# Patient Record
Sex: FEMALE | Race: WHITE | NOT HISPANIC OR LATINO
[De-identification: names, ages, dates, MRNs, and addresses within clinical notes are randomized per-mention and may not be internally consistent; named-entity substitution may affect disease eponyms.]

---

## 2022-04-07 PROBLEM — Z00.00 ENCOUNTER FOR PREVENTIVE HEALTH EXAMINATION: Status: ACTIVE | Noted: 2022-04-07

## 2022-04-10 ENCOUNTER — NON-APPOINTMENT (OUTPATIENT)
Age: 37
End: 2022-04-10

## 2022-04-11 ENCOUNTER — NON-APPOINTMENT (OUTPATIENT)
Age: 37
End: 2022-04-11

## 2022-04-11 ENCOUNTER — APPOINTMENT (OUTPATIENT)
Dept: COLORECTAL SURGERY | Facility: CLINIC | Age: 37
End: 2022-04-11
Payer: MEDICAID

## 2022-04-11 VITALS
HEIGHT: 61 IN | WEIGHT: 113 LBS | BODY MASS INDEX: 21.34 KG/M2 | HEART RATE: 69 BPM | TEMPERATURE: 98.3 F | DIASTOLIC BLOOD PRESSURE: 84 MMHG | SYSTOLIC BLOOD PRESSURE: 138 MMHG

## 2022-04-11 DIAGNOSIS — F41.9 ANXIETY DISORDER, UNSPECIFIED: ICD-10-CM

## 2022-04-11 DIAGNOSIS — Z86.010 PERSONAL HISTORY OF COLONIC POLYPS: ICD-10-CM

## 2022-04-11 DIAGNOSIS — Z78.9 OTHER SPECIFIED HEALTH STATUS: ICD-10-CM

## 2022-04-11 DIAGNOSIS — Z83.3 FAMILY HISTORY OF DIABETES MELLITUS: ICD-10-CM

## 2022-04-11 DIAGNOSIS — Z01.818 ENCOUNTER FOR OTHER PREPROCEDURAL EXAMINATION: ICD-10-CM

## 2022-04-11 PROCEDURE — 99205 OFFICE O/P NEW HI 60 MIN: CPT

## 2022-04-11 RX ORDER — METRONIDAZOLE 500 MG/1
500 TABLET ORAL
Qty: 6 | Refills: 0 | Status: ACTIVE | COMMUNITY
Start: 2022-04-11 | End: 1900-01-01

## 2022-04-11 RX ORDER — NEOMYCIN SULFATE 500 MG/1
500 TABLET ORAL
Qty: 6 | Refills: 0 | Status: ACTIVE | COMMUNITY
Start: 2022-04-11 | End: 1900-01-01

## 2022-04-11 NOTE — HISTORY OF PRESENT ILLNESS
[FreeTextEntry1] : 37 y/o F presents for evaluation of colon cancer, referred by GI Dr. Carolin Nur\par H/o anxiety \par \par Pt initially [facility] for 6 months of intermittent abdominal pain per pt described "stabbing quality with change in BH, intermittent soft stool with cramping urgency" BRBPR . \par \par Pt underwent initial screening colonoscopy on 4/5/22:\par Ulcerated, friable circumferential lesion in midtransverse colon, biopsies were taken and distal margin was marked with two Rosario ink tattoos.  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 No adenoma appreciated\par \par Referred for CT A/P 4/1/22 (completed at [facility]):\par Impression: \par 5 cm circumferential bowel wall thickening of the transverse colon highly suspicious for primary colonic malignancy\par Adjacent necrotic appearing lymphadenopathy highly suspicious for metastatic disease \par Indeterminate 5 mm hypoenhancing lesions in the left lobe of the liver \par No large bowel obstruction\par \par \par Prior imaging includes Abdominal US completed [facility] 2/4/22\par Impression: \par 4 mm gallbladder polyp which based on ACR guidelines does not require further evaluation. \par \par \par c/o\par Denies\par fever, unintentional weight loss, n/v/c/d or change in BHs\par \par BH: \par FMH of colorectal cancer, Mother (+) Celiac disease \par

## 2022-04-11 NOTE — PHYSICAL EXAM
[Abdomen Masses] : No abdominal masses [Abdomen Tenderness] : ~T No ~M abdominal tenderness [No HSM] : no hepatosplenomegaly [JVD] : no jugular venous distention  [Normal Breath Sounds] : Normal breath sounds [Normal Heart Sounds] : normal heart sounds [Alert] : alert [Calm] : calm

## 2022-04-11 NOTE — ASSESSMENT
[FreeTextEntry1] : Transverse colon cancer–mismatch repair status pending.\par \par CT with consistent with local/regional adenopathy.\par \par I had extensive discussion with the patient and her  regarding her diagnosis.  Recommend CT of chest for completion of staging.\par \par I had extensive discussion with the patient (60 minutes) regarding the diagnosis and treatment options. I recommended that he consider proceeding with a robotic assisted extended right hemicolectomy.\par The associated risks, benefits, alternatives of the procedure have been outlined discussed and reviewed with the patient's family. These risks including but not limited to bleeding, infection, anastomotic leak, need for secondary surgery, need for ileostomy or colostomy creation, change in bowel habits,  as well as the risk of heart and lung complications infection and death were detailed. The patient understands these risks and consents the planned procedure. Appropriate  literature regarding surgery and post operative treatment/complications and enhanced recovery pathway has been detailed and reviewed. Consent was obtained. All questions were answered.\par

## 2022-04-13 ENCOUNTER — OUTPATIENT (OUTPATIENT)
Dept: OUTPATIENT SERVICES | Facility: HOSPITAL | Age: 37
LOS: 1 days | End: 2022-04-13
Payer: COMMERCIAL

## 2022-04-13 ENCOUNTER — RESULT REVIEW (OUTPATIENT)
Age: 37
End: 2022-04-13

## 2022-04-13 DIAGNOSIS — C18.9 MALIGNANT NEOPLASM OF COLON, UNSPECIFIED: ICD-10-CM

## 2022-04-13 PROCEDURE — 88321 CONSLTJ&REPRT SLD PREP ELSWR: CPT

## 2022-04-14 LAB — SURGICAL PATHOLOGY STUDY: SIGNIFICANT CHANGE UP

## 2022-04-18 ENCOUNTER — NON-APPOINTMENT (OUTPATIENT)
Age: 37
End: 2022-04-18

## 2022-04-26 ENCOUNTER — TRANSCRIPTION ENCOUNTER (OUTPATIENT)
Age: 37
End: 2022-04-26

## 2022-04-26 VITALS
HEIGHT: 61 IN | HEART RATE: 82 BPM | WEIGHT: 112.44 LBS | OXYGEN SATURATION: 98 % | RESPIRATION RATE: 16 BRPM | DIASTOLIC BLOOD PRESSURE: 82 MMHG | TEMPERATURE: 98 F | SYSTOLIC BLOOD PRESSURE: 123 MMHG

## 2022-04-26 RX ORDER — CLONAZEPAM 1 MG
1 TABLET ORAL
Qty: 0 | Refills: 0 | DISCHARGE

## 2022-04-26 RX ORDER — LEVONORGESTREL 1.5 MG/1
0 TABLET ORAL
Qty: 0 | Refills: 0 | DISCHARGE

## 2022-04-26 RX ORDER — SERTRALINE 25 MG/1
1 TABLET, FILM COATED ORAL
Qty: 0 | Refills: 0 | DISCHARGE

## 2022-04-26 NOTE — PATIENT PROFILE ADULT - FALL HARM RISK - UNIVERSAL INTERVENTIONS
Bed in lowest position, wheels locked, appropriate side rails in place/Call bell, personal items and telephone in reach/Instruct patient to call for assistance before getting out of bed or chair/Non-slip footwear when patient is out of bed/[place] to call system/Physically safe environment - no spills, clutter or unnecessary equipment/Purposeful Proactive Rounding/Room/bathroom lighting operational, light cord in reach

## 2022-04-26 NOTE — PATIENT PROFILE ADULT - VISION (WITH CORRECTIVE LENSES IF THE PATIENT USUALLY WEARS THEM):
partial dentures/in place today Partially impaired: cannot see medication labels or newsprint, but can see obstacles in path, and the surrounding layout; can count fingers at arm's length

## 2022-04-27 ENCOUNTER — TRANSCRIPTION ENCOUNTER (OUTPATIENT)
Age: 37
End: 2022-04-27

## 2022-04-27 ENCOUNTER — INPATIENT (INPATIENT)
Facility: HOSPITAL | Age: 37
LOS: 3 days | Discharge: HOME | DRG: 331 | End: 2022-05-01
Attending: SURGERY | Admitting: SURGERY
Payer: COMMERCIAL

## 2022-04-27 ENCOUNTER — APPOINTMENT (OUTPATIENT)
Dept: COLORECTAL SURGERY | Facility: HOSPITAL | Age: 37
End: 2022-04-27

## 2022-04-27 ENCOUNTER — RESULT REVIEW (OUTPATIENT)
Age: 37
End: 2022-04-27

## 2022-04-27 LAB
BLD GP AB SCN SERPL QL: NEGATIVE — SIGNIFICANT CHANGE UP
RH IG SCN BLD-IMP: POSITIVE — SIGNIFICANT CHANGE UP

## 2022-04-27 PROCEDURE — 88309 TISSUE EXAM BY PATHOLOGIST: CPT | Mod: 26

## 2022-04-27 PROCEDURE — 44210 LAPARO TOTAL PROCTOCOLECTOMY: CPT | Mod: GC

## 2022-04-27 PROCEDURE — 88341 IMHCHEM/IMCYTCHM EA ADD ANTB: CPT | Mod: 26

## 2022-04-27 PROCEDURE — 88342 IMHCHEM/IMCYTCHM 1ST ANTB: CPT | Mod: 26

## 2022-04-27 DEVICE — XI STAPLER SUREFORM RELOAD 60 BLUE
Type: IMPLANTABLE DEVICE | Status: NON-FUNCTIONAL
Removed: 2022-04-27

## 2022-04-27 DEVICE — XI STAPLER SUREFORM RELOAD 60 WHITE
Type: IMPLANTABLE DEVICE | Status: NON-FUNCTIONAL
Removed: 2022-04-27

## 2022-04-27 RX ORDER — ACETAMINOPHEN 500 MG
1000 TABLET ORAL ONCE
Refills: 0 | Status: COMPLETED | OUTPATIENT
Start: 2022-04-27 | End: 2022-04-27

## 2022-04-27 RX ORDER — SODIUM CHLORIDE 9 MG/ML
500 INJECTION, SOLUTION INTRAVENOUS ONCE
Refills: 0 | Status: COMPLETED | OUTPATIENT
Start: 2022-04-27 | End: 2022-04-27

## 2022-04-27 RX ORDER — ONDANSETRON 8 MG/1
4 TABLET, FILM COATED ORAL EVERY 6 HOURS
Refills: 0 | Status: DISCONTINUED | OUTPATIENT
Start: 2022-04-27 | End: 2022-05-01

## 2022-04-27 RX ORDER — SODIUM CHLORIDE 9 MG/ML
1000 INJECTION, SOLUTION INTRAVENOUS
Refills: 0 | Status: DISCONTINUED | OUTPATIENT
Start: 2022-04-27 | End: 2022-05-01

## 2022-04-27 RX ORDER — HYDROMORPHONE HYDROCHLORIDE 2 MG/ML
0.5 INJECTION INTRAMUSCULAR; INTRAVENOUS; SUBCUTANEOUS EVERY 4 HOURS
Refills: 0 | Status: DISCONTINUED | OUTPATIENT
Start: 2022-04-27 | End: 2022-04-30

## 2022-04-27 RX ORDER — HEPARIN SODIUM 5000 [USP'U]/ML
5000 INJECTION INTRAVENOUS; SUBCUTANEOUS ONCE
Refills: 0 | Status: COMPLETED | OUTPATIENT
Start: 2022-04-27 | End: 2022-04-27

## 2022-04-27 RX ORDER — CLONAZEPAM 1 MG
0.5 TABLET ORAL THREE TIMES A DAY
Refills: 0 | Status: DISCONTINUED | OUTPATIENT
Start: 2022-04-27 | End: 2022-05-01

## 2022-04-27 RX ORDER — KETOROLAC TROMETHAMINE 30 MG/ML
15 SYRINGE (ML) INJECTION EVERY 6 HOURS
Refills: 0 | Status: DISCONTINUED | OUTPATIENT
Start: 2022-04-27 | End: 2022-04-29

## 2022-04-27 RX ORDER — ACETAMINOPHEN 500 MG
1000 TABLET ORAL EVERY 6 HOURS
Refills: 0 | Status: DISCONTINUED | OUTPATIENT
Start: 2022-04-27 | End: 2022-05-01

## 2022-04-27 RX ORDER — SERTRALINE 25 MG/1
50 TABLET, FILM COATED ORAL DAILY
Refills: 0 | Status: DISCONTINUED | OUTPATIENT
Start: 2022-04-27 | End: 2022-05-01

## 2022-04-27 RX ORDER — HEPARIN SODIUM 5000 [USP'U]/ML
5000 INJECTION INTRAVENOUS; SUBCUTANEOUS EVERY 8 HOURS
Refills: 0 | Status: DISCONTINUED | OUTPATIENT
Start: 2022-04-27 | End: 2022-05-01

## 2022-04-27 RX ORDER — BUPIVACAINE 13.3 MG/ML
20 INJECTION, SUSPENSION, LIPOSOMAL INFILTRATION ONCE
Refills: 0 | Status: DISCONTINUED | OUTPATIENT
Start: 2022-04-27 | End: 2022-04-27

## 2022-04-27 RX ADMIN — HEPARIN SODIUM 5000 UNIT(S): 5000 INJECTION INTRAVENOUS; SUBCUTANEOUS at 08:00

## 2022-04-27 RX ADMIN — Medication 15 MILLIGRAM(S): at 17:49

## 2022-04-27 RX ADMIN — Medication 15 MILLIGRAM(S): at 17:34

## 2022-04-27 RX ADMIN — SODIUM CHLORIDE 40 MILLILITER(S): 9 INJECTION, SOLUTION INTRAVENOUS at 13:32

## 2022-04-27 RX ADMIN — Medication 15 MILLIGRAM(S): at 23:01

## 2022-04-27 RX ADMIN — SERTRALINE 50 MILLIGRAM(S): 25 TABLET, FILM COATED ORAL at 23:01

## 2022-04-27 RX ADMIN — HEPARIN SODIUM 5000 UNIT(S): 5000 INJECTION INTRAVENOUS; SUBCUTANEOUS at 19:17

## 2022-04-27 RX ADMIN — Medication 400 MILLIGRAM(S): at 23:50

## 2022-04-27 RX ADMIN — SODIUM CHLORIDE 1000 MILLILITER(S): 9 INJECTION, SOLUTION INTRAVENOUS at 13:34

## 2022-04-27 RX ADMIN — Medication 1000 MILLIGRAM(S): at 08:00

## 2022-04-27 RX ADMIN — SODIUM CHLORIDE 40 MILLILITER(S): 9 INJECTION, SOLUTION INTRAVENOUS at 13:34

## 2022-04-27 RX ADMIN — Medication 15 MILLIGRAM(S): at 23:35

## 2022-04-27 NOTE — PACU DISCHARGE NOTE - COMMENTS
Pt A&ox4, operative site clean, dry and intact with dermabond; denies pain at present; pt required one time 500cc LR bolus for low urine output with noted increase to uo upon reassessment. Plan of care endorsed to receiving marge Joya

## 2022-04-27 NOTE — H&P ADULT - HISTORY OF PRESENT ILLNESS
36 year old female with PMH of anxiety, recently diagnosed colon cancer presents for extended right hemicolectomy. Patient initially presented with 6 month history of abdominal pain and bright red blood per rectum. Patient underwent colonoscopy on 4/5/22 which found an ulcerated, friable circumferential lesion in the mid transverse colon; pathology was consistent with infiltrating moderately differentiated colonic adenocarcinoma. Patient does not have complaints at this time.

## 2022-04-27 NOTE — H&P ADULT - ASSESSMENT
36 year old female with PMH of anxiety, recently diagnosed colon cancer presents for extended right hemicolectomy.    Plan:  to OR  admission post op  ERAS protocol  Colon bundle

## 2022-04-27 NOTE — BRIEF OPERATIVE NOTE - OPERATION/FINDINGS
cancer of the transverse colon with adhesion to the omentum. Bulky lymphadenopathy of the iliocolic and middle colic with resection of identified lymph nodes in specimen.    Veress needle access. Optiview placement of 12mm port; rest of ports placed under direct visualization. TAP block. Right colon, hepatic flexure, transverse colon and splenic flexure mobilized. High ligation of ileocolic and both branches of the middle colic with Ligasure. Proximal and distal margins taken with robotic stapler blue load. Ileocolic anastomosis created with robotic stapler white load x 1 fire. Common channel closed with V-lock running suture and Lemberted. Hemostatic. 8cm off midline incision created in RLQ and specimen removed. Closing tray utilized; gowns and gloves changed. Peritoneum closed with 2-0 Vicryl running; fascia closed with #1 PDS running. Closed skin with 3-0 Vicryl deep dermal and 4-0 Monocryl.

## 2022-04-27 NOTE — BRIEF OPERATIVE NOTE - NSICDXBRIEFPROCEDURE_GEN_ALL_CORE_FT
PROCEDURES:  Robot-assisted laparoscopic subtotal colectomy with laparotomy if indicated 27-Apr-2022 11:56:49  Carol Akers

## 2022-04-28 LAB
ANION GAP SERPL CALC-SCNC: 10 MMOL/L — SIGNIFICANT CHANGE UP (ref 5–17)
BUN SERPL-MCNC: 7 MG/DL — SIGNIFICANT CHANGE UP (ref 7–23)
CALCIUM SERPL-MCNC: 8.2 MG/DL — LOW (ref 8.4–10.5)
CHLORIDE SERPL-SCNC: 106 MMOL/L — SIGNIFICANT CHANGE UP (ref 96–108)
CO2 SERPL-SCNC: 23 MMOL/L — SIGNIFICANT CHANGE UP (ref 22–31)
CREAT SERPL-MCNC: 0.77 MG/DL — SIGNIFICANT CHANGE UP (ref 0.5–1.3)
EGFR: 102 ML/MIN/1.73M2 — SIGNIFICANT CHANGE UP
GLUCOSE SERPL-MCNC: 86 MG/DL — SIGNIFICANT CHANGE UP (ref 70–99)
HCT VFR BLD CALC: 34.3 % — LOW (ref 34.5–45)
HGB BLD-MCNC: 11.2 G/DL — LOW (ref 11.5–15.5)
MAGNESIUM SERPL-MCNC: 1.8 MG/DL — SIGNIFICANT CHANGE UP (ref 1.6–2.6)
MCHC RBC-ENTMCNC: 27.1 PG — SIGNIFICANT CHANGE UP (ref 27–34)
MCHC RBC-ENTMCNC: 32.7 GM/DL — SIGNIFICANT CHANGE UP (ref 32–36)
MCV RBC AUTO: 82.9 FL — SIGNIFICANT CHANGE UP (ref 80–100)
NRBC # BLD: 0 /100 WBCS — SIGNIFICANT CHANGE UP (ref 0–0)
PHOSPHATE SERPL-MCNC: 2.2 MG/DL — LOW (ref 2.5–4.5)
PLATELET # BLD AUTO: 293 K/UL — SIGNIFICANT CHANGE UP (ref 150–400)
POTASSIUM SERPL-MCNC: 4.4 MMOL/L — SIGNIFICANT CHANGE UP (ref 3.5–5.3)
POTASSIUM SERPL-SCNC: 4.4 MMOL/L — SIGNIFICANT CHANGE UP (ref 3.5–5.3)
RBC # BLD: 4.14 M/UL — SIGNIFICANT CHANGE UP (ref 3.8–5.2)
RBC # FLD: 13.4 % — SIGNIFICANT CHANGE UP (ref 10.3–14.5)
SODIUM SERPL-SCNC: 139 MMOL/L — SIGNIFICANT CHANGE UP (ref 135–145)
WBC # BLD: 8.86 K/UL — SIGNIFICANT CHANGE UP (ref 3.8–10.5)
WBC # FLD AUTO: 8.86 K/UL — SIGNIFICANT CHANGE UP (ref 3.8–10.5)

## 2022-04-28 RX ORDER — MAGNESIUM SULFATE 500 MG/ML
1 VIAL (ML) INJECTION ONCE
Refills: 0 | Status: COMPLETED | OUTPATIENT
Start: 2022-04-28 | End: 2022-04-28

## 2022-04-28 RX ADMIN — Medication 15 MILLIGRAM(S): at 23:40

## 2022-04-28 RX ADMIN — Medication 15 MILLIGRAM(S): at 17:25

## 2022-04-28 RX ADMIN — Medication 1000 MILLIGRAM(S): at 23:40

## 2022-04-28 RX ADMIN — Medication 1000 MILLIGRAM(S): at 00:00

## 2022-04-28 RX ADMIN — Medication 15 MILLIGRAM(S): at 11:27

## 2022-04-28 RX ADMIN — Medication 15 MILLIGRAM(S): at 11:45

## 2022-04-28 RX ADMIN — Medication 15 MILLIGRAM(S): at 06:55

## 2022-04-28 RX ADMIN — SERTRALINE 50 MILLIGRAM(S): 25 TABLET, FILM COATED ORAL at 23:41

## 2022-04-28 RX ADMIN — HEPARIN SODIUM 5000 UNIT(S): 5000 INJECTION INTRAVENOUS; SUBCUTANEOUS at 03:05

## 2022-04-28 RX ADMIN — Medication 62.5 MILLIMOLE(S): at 11:27

## 2022-04-28 RX ADMIN — Medication 100 GRAM(S): at 09:52

## 2022-04-28 RX ADMIN — Medication 15 MILLIGRAM(S): at 17:40

## 2022-04-28 RX ADMIN — HEPARIN SODIUM 5000 UNIT(S): 5000 INJECTION INTRAVENOUS; SUBCUTANEOUS at 11:27

## 2022-04-28 RX ADMIN — Medication 15 MILLIGRAM(S): at 05:37

## 2022-04-28 RX ADMIN — HEPARIN SODIUM 5000 UNIT(S): 5000 INJECTION INTRAVENOUS; SUBCUTANEOUS at 19:14

## 2022-04-29 ENCOUNTER — TRANSCRIPTION ENCOUNTER (OUTPATIENT)
Age: 37
End: 2022-04-29

## 2022-04-29 LAB
ANION GAP SERPL CALC-SCNC: 8 MMOL/L — SIGNIFICANT CHANGE UP (ref 5–17)
BUN SERPL-MCNC: 8 MG/DL — SIGNIFICANT CHANGE UP (ref 7–23)
CALCIUM SERPL-MCNC: 8.2 MG/DL — LOW (ref 8.4–10.5)
CHLORIDE SERPL-SCNC: 106 MMOL/L — SIGNIFICANT CHANGE UP (ref 96–108)
CO2 SERPL-SCNC: 26 MMOL/L — SIGNIFICANT CHANGE UP (ref 22–31)
CREAT SERPL-MCNC: 0.76 MG/DL — SIGNIFICANT CHANGE UP (ref 0.5–1.3)
EGFR: 103 ML/MIN/1.73M2 — SIGNIFICANT CHANGE UP
GLUCOSE SERPL-MCNC: 84 MG/DL — SIGNIFICANT CHANGE UP (ref 70–99)
HCT VFR BLD CALC: 32.7 % — LOW (ref 34.5–45)
HGB BLD-MCNC: 10.5 G/DL — LOW (ref 11.5–15.5)
MAGNESIUM SERPL-MCNC: 2 MG/DL — SIGNIFICANT CHANGE UP (ref 1.6–2.6)
MCHC RBC-ENTMCNC: 27.4 PG — SIGNIFICANT CHANGE UP (ref 27–34)
MCHC RBC-ENTMCNC: 32.1 GM/DL — SIGNIFICANT CHANGE UP (ref 32–36)
MCV RBC AUTO: 85.4 FL — SIGNIFICANT CHANGE UP (ref 80–100)
NRBC # BLD: 0 /100 WBCS — SIGNIFICANT CHANGE UP (ref 0–0)
PHOSPHATE SERPL-MCNC: 2 MG/DL — LOW (ref 2.5–4.5)
PLATELET # BLD AUTO: 289 K/UL — SIGNIFICANT CHANGE UP (ref 150–400)
POTASSIUM SERPL-MCNC: 4.6 MMOL/L — SIGNIFICANT CHANGE UP (ref 3.5–5.3)
POTASSIUM SERPL-SCNC: 4.6 MMOL/L — SIGNIFICANT CHANGE UP (ref 3.5–5.3)
RBC # BLD: 3.83 M/UL — SIGNIFICANT CHANGE UP (ref 3.8–5.2)
RBC # FLD: 13.4 % — SIGNIFICANT CHANGE UP (ref 10.3–14.5)
SODIUM SERPL-SCNC: 140 MMOL/L — SIGNIFICANT CHANGE UP (ref 135–145)
WBC # BLD: 7.4 K/UL — SIGNIFICANT CHANGE UP (ref 3.8–10.5)
WBC # FLD AUTO: 7.4 K/UL — SIGNIFICANT CHANGE UP (ref 3.8–10.5)

## 2022-04-29 RX ORDER — POTASSIUM PHOSPHATE, MONOBASIC POTASSIUM PHOSPHATE, DIBASIC 236; 224 MG/ML; MG/ML
15 INJECTION, SOLUTION INTRAVENOUS ONCE
Refills: 0 | Status: COMPLETED | OUTPATIENT
Start: 2022-04-29 | End: 2022-04-29

## 2022-04-29 RX ADMIN — HEPARIN SODIUM 5000 UNIT(S): 5000 INJECTION INTRAVENOUS; SUBCUTANEOUS at 04:55

## 2022-04-29 RX ADMIN — Medication 1000 MILLIGRAM(S): at 19:05

## 2022-04-29 RX ADMIN — HEPARIN SODIUM 5000 UNIT(S): 5000 INJECTION INTRAVENOUS; SUBCUTANEOUS at 12:04

## 2022-04-29 RX ADMIN — Medication 15 MILLIGRAM(S): at 00:00

## 2022-04-29 RX ADMIN — Medication 1000 MILLIGRAM(S): at 13:59

## 2022-04-29 RX ADMIN — Medication 1000 MILLIGRAM(S): at 12:59

## 2022-04-29 RX ADMIN — Medication 15 MILLIGRAM(S): at 06:54

## 2022-04-29 RX ADMIN — Medication 15 MILLIGRAM(S): at 07:20

## 2022-04-29 RX ADMIN — SERTRALINE 50 MILLIGRAM(S): 25 TABLET, FILM COATED ORAL at 00:29

## 2022-04-29 RX ADMIN — HEPARIN SODIUM 5000 UNIT(S): 5000 INJECTION INTRAVENOUS; SUBCUTANEOUS at 19:05

## 2022-04-29 RX ADMIN — Medication 1000 MILLIGRAM(S): at 00:50

## 2022-04-29 RX ADMIN — SODIUM CHLORIDE 40 MILLILITER(S): 9 INJECTION, SOLUTION INTRAVENOUS at 21:56

## 2022-04-29 RX ADMIN — POTASSIUM PHOSPHATE, MONOBASIC POTASSIUM PHOSPHATE, DIBASIC 62.5 MILLIMOLE(S): 236; 224 INJECTION, SOLUTION INTRAVENOUS at 10:43

## 2022-04-29 RX ADMIN — SERTRALINE 50 MILLIGRAM(S): 25 TABLET, FILM COATED ORAL at 22:53

## 2022-04-29 NOTE — DISCHARGE NOTE PROVIDER - NSDCFUADDINST_GEN_ALL_CORE_FT
-Please take Colace (Docusate) 100 mg twice a day. Please take Percocet if needed for pain.  -Please follow a low fiber diet: Vegetables should initially be cooked (backed, steamed, blanched, etc.). May want to start with peeled and/or canned fruit. Limit fat/oil intake and fried/greasy foods. Add small amounts of fiber back in to the diet every couple days  -Call the office to schedule an appointment 2 weeks after surgery. The office number is listed below.    Please continue a LOW-FIBER DIET. Listed below are some foods you may eat and those you should avoid.   --Allowed foods:  White bread without nuts and seeds  White rice, plain white pasta, and crackers  Refined hot cereals, such as Cream of Wheat, or cold cereals with less than 1 gram of fiber per serving  Pancakes or waffles made from white refined flour  Most canned or well-cooked vegetables and fruits without skins or seeds  Fruit and vegetable juice with little or no pulp, fruit-flavored drinks, and flavored garcia  Tender meat, poultry, fish, eggs and tofu  Milk and foods made from milk — such as yogurt, pudding, ice cream, cheeses and sour cream — if tolerated  Butter, margarine, oils and salad dressings without seeds  --Foods to avoid:  Whole-wheat or whole-grain breads, cereals and pasta  Brown or wild rice and other whole grains, such as oats, kasha, barley and quinoa  Dried fruits and prune juice  Raw fruit, including those with seeds, skin or membranes, such as berries  Raw or undercooked vegetables, including corn  Dried beans, peas and lentils  Seeds and nuts and foods containing them, including peanut butter and other nut butters  Coconut  Popcorn   -Please take Colace (Docusate) 100 mg twice a day. Please take Percocet if needed for pain.  -Please follow a low fiber diet: Vegetables should initially be cooked (backed, steamed, blanched, etc.). May want to start with peeled and/or canned fruit. Limit fat/oil intake and fried/greasy foods. Add small amounts of fiber back in to the diet every couple days  -Call the office to schedule an appointment 2 weeks after surgery. The office number is listed below.    Please continue a LOW-FIBER DIET. Listed below are some foods you may eat and those you should avoid.   --Allowed foods:  White bread without nuts and seeds  White rice, plain white pasta, and crackers  Refined hot cereals, such as Cream of Wheat, or cold cereals with less than 1 gram of fiber per serving  Pancakes or waffles made from white refined flour  Most canned or well-cooked vegetables and fruits without skins or seeds  Fruit and vegetable juice with little or no pulp, fruit-flavored drinks, and flavored garcia  Tender meat, poultry, fish, eggs and tofu  Milk and foods made from milk — such as yogurt, pudding, ice cream, cheeses and sour cream — if tolerated  Butter, margarine, oils and salad dressings without seeds  --Foods to avoid:  Whole-wheat or whole-grain breads, cereals and pasta  Brown or wild rice and other whole grains, such as oats, kasha, barley and quinoa  Dried fruits and prune juice  Raw fruit, including those with seeds, skin or membranes, such as berries  Raw or undercooked vegetables, including corn  Dried beans, peas and lentils  Seeds and nuts and foods containing them, including peanut butter and other nut butters  Coconut  Popcorn    Warning Signs:  Please call your doctor or nurse practitioner if you experience the following:  *You experience new chest pain, pressure, squeezing or tightness.  *New or worsening cough, shortness of breath, or wheeze.  *If you are vomiting and cannot keep down fluids or your medications.  *You are getting dehydrated due to continued vomiting, diarrhea, or other reasons. Signs of dehydration include dry mouth, rapid heartbeat, or feeling dizzy or faint when standing.  *You see blood or dark/black material when you vomit or have a bowel movement.  *You experience burning when you urinate, have blood in your urine, or experience a discharge.  *Your pain is not improving within 8-12 hours or is not gone within 24 hours. Call or return immediately if your pain is getting worse, changes location, or moves to your chest or back.  *You have shaking chills, or fever greater than 101.5 degrees Fahrenheit or 38 degrees Celsius.  *Any change in your symptoms, or any new symptoms that concern you.

## 2022-04-29 NOTE — DISCHARGE NOTE PROVIDER - NSDCCPTREATMENT_GEN_ALL_CORE_FT
PRINCIPAL PROCEDURE  Procedure: Robot-assisted laparoscopic subtotal colectomy with laparotomy if indicated  Findings and Treatment:

## 2022-04-29 NOTE — DISCHARGE NOTE PROVIDER - NSDCMRMEDTOKEN_GEN_ALL_CORE_FT
clonazePAM 0.5 mg oral tablet: 1 tab(s) orally 3 times a day  levonorgestrel 0.75 mg oral tablet:   Zoloft 50 mg oral tablet: 1 tab(s) orally once a day   clonazePAM 0.5 mg oral tablet: 1 tab(s) orally 3 times a day  Colace 100 mg oral capsule: 1 cap(s) orally 2 times a day, As Needed -for constipation   levonorgestrel 0.75 mg oral tablet:   oxycodone-acetaminophen 5 mg-325 mg oral tablet: 1 tab(s) orally every 6 hours, As Needed -for severe pain MDD:4 tablets   Zoloft 50 mg oral tablet: 1 tab(s) orally once a day

## 2022-04-29 NOTE — DISCHARGE NOTE PROVIDER - HOSPITAL COURSE
36 year old female with PMH of anxiety, recently diagnosed colon cancer presents for extended right hemicolectomy. Patient initially presented with 6 month history of abdominal pain and bright red blood per rectum. Patient underwent colonoscopy on 4/5/22 which found an ulcerated, friable circumferential lesion in the mid transverse colon; pathology was consistent with infiltrating moderately differentiated colonic adenocarcinoma. Patient proceeded to the operating room for robotic assisted subtotal colectomy. The procedure was uncomplicated. There were no intraoperative or immediate postoperative complications. Following the procedure she was admitted to the Surgical Service. Diet was advanced and the escalante catheter removed. Upon discharge, pain is well controlled, she is tolerating diet, with appropriate return of bowel function. She is instructed to follow up with Dr. Huntley in the office. 36 year old female with PMH of anxiety, recently diagnosed colon cancer presents for extended right hemicolectomy. Patient initially presented with 6 month history of abdominal pain and bright red blood per rectum. Patient underwent colonoscopy on 4/5/22 which found an ulcerated, friable circumferential lesion in the mid transverse colon; pathology was consistent with infiltrating moderately differentiated colonic adenocarcinoma. Patient proceeded to the operating room for robotic assisted subtotal colectomy. The procedure was uncomplicated. There were no intraoperative or immediate postoperative complications. Following the procedure she was admitted to the Surgical Service. Diet was advanced and the escalante catheter removed. Patient experienced dark bloody stools appropriate s/p surgery and was monitored closely with serial abdominal exams, vitals and labs. Upon discharge, pain is well controlled, she is tolerating diet, with appropriate return of bowel function, having nonbloody stools, VSS, CBC stable. She is instructed to follow up with Dr. Huntley in the office. Pt HD stable and medically ready for discharge.

## 2022-04-29 NOTE — DISCHARGE NOTE PROVIDER - CARE PROVIDER_API CALL
Mendez Huntley)  ColonRectal Surgery; Surgery  [facility], [address]nd [place] [address]  Phone: [phone]  Fax: [phone]  Follow Up Time: 1 week

## 2022-04-30 LAB
ANION GAP SERPL CALC-SCNC: 9 MMOL/L — SIGNIFICANT CHANGE UP (ref 5–17)
BUN SERPL-MCNC: 6 MG/DL — LOW (ref 7–23)
CALCIUM SERPL-MCNC: 8.1 MG/DL — LOW (ref 8.4–10.5)
CHLORIDE SERPL-SCNC: 108 MMOL/L — SIGNIFICANT CHANGE UP (ref 96–108)
CO2 SERPL-SCNC: 23 MMOL/L — SIGNIFICANT CHANGE UP (ref 22–31)
CREAT SERPL-MCNC: 0.66 MG/DL — SIGNIFICANT CHANGE UP (ref 0.5–1.3)
EGFR: 116 ML/MIN/1.73M2 — SIGNIFICANT CHANGE UP
GLUCOSE SERPL-MCNC: 79 MG/DL — SIGNIFICANT CHANGE UP (ref 70–99)
HCT VFR BLD CALC: 29 % — LOW (ref 34.5–45)
HCT VFR BLD CALC: 29.1 % — LOW (ref 34.5–45)
HGB BLD-MCNC: 9.4 G/DL — LOW (ref 11.5–15.5)
HGB BLD-MCNC: 9.7 G/DL — LOW (ref 11.5–15.5)
MAGNESIUM SERPL-MCNC: 1.5 MG/DL — LOW (ref 1.6–2.6)
MCHC RBC-ENTMCNC: 27.2 PG — SIGNIFICANT CHANGE UP (ref 27–34)
MCHC RBC-ENTMCNC: 28 PG — SIGNIFICANT CHANGE UP (ref 27–34)
MCHC RBC-ENTMCNC: 32.3 GM/DL — SIGNIFICANT CHANGE UP (ref 32–36)
MCHC RBC-ENTMCNC: 33.4 GM/DL — SIGNIFICANT CHANGE UP (ref 32–36)
MCV RBC AUTO: 83.8 FL — SIGNIFICANT CHANGE UP (ref 80–100)
MCV RBC AUTO: 84.1 FL — SIGNIFICANT CHANGE UP (ref 80–100)
NRBC # BLD: 0 /100 WBCS — SIGNIFICANT CHANGE UP (ref 0–0)
NRBC # BLD: 0 /100 WBCS — SIGNIFICANT CHANGE UP (ref 0–0)
PHOSPHATE SERPL-MCNC: 1.9 MG/DL — LOW (ref 2.5–4.5)
PLATELET # BLD AUTO: 268 K/UL — SIGNIFICANT CHANGE UP (ref 150–400)
PLATELET # BLD AUTO: 280 K/UL — SIGNIFICANT CHANGE UP (ref 150–400)
POTASSIUM SERPL-MCNC: 4.2 MMOL/L — SIGNIFICANT CHANGE UP (ref 3.5–5.3)
POTASSIUM SERPL-SCNC: 4.2 MMOL/L — SIGNIFICANT CHANGE UP (ref 3.5–5.3)
RBC # BLD: 3.46 M/UL — LOW (ref 3.8–5.2)
RBC # BLD: 3.46 M/UL — LOW (ref 3.8–5.2)
RBC # FLD: 13.3 % — SIGNIFICANT CHANGE UP (ref 10.3–14.5)
RBC # FLD: 13.5 % — SIGNIFICANT CHANGE UP (ref 10.3–14.5)
SODIUM SERPL-SCNC: 140 MMOL/L — SIGNIFICANT CHANGE UP (ref 135–145)
WBC # BLD: 6.05 K/UL — SIGNIFICANT CHANGE UP (ref 3.8–10.5)
WBC # BLD: 6.25 K/UL — SIGNIFICANT CHANGE UP (ref 3.8–10.5)
WBC # FLD AUTO: 6.05 K/UL — SIGNIFICANT CHANGE UP (ref 3.8–10.5)
WBC # FLD AUTO: 6.25 K/UL — SIGNIFICANT CHANGE UP (ref 3.8–10.5)

## 2022-04-30 RX ORDER — OXYCODONE HYDROCHLORIDE 5 MG/1
5 TABLET ORAL EVERY 6 HOURS
Refills: 0 | Status: DISCONTINUED | OUTPATIENT
Start: 2022-04-30 | End: 2022-05-01

## 2022-04-30 RX ORDER — MAGNESIUM SULFATE 500 MG/ML
2 VIAL (ML) INJECTION EVERY 6 HOURS
Refills: 0 | Status: COMPLETED | OUTPATIENT
Start: 2022-04-30 | End: 2022-04-30

## 2022-04-30 RX ADMIN — Medication 1000 MILLIGRAM(S): at 23:42

## 2022-04-30 RX ADMIN — SERTRALINE 50 MILLIGRAM(S): 25 TABLET, FILM COATED ORAL at 22:56

## 2022-04-30 RX ADMIN — HEPARIN SODIUM 5000 UNIT(S): 5000 INJECTION INTRAVENOUS; SUBCUTANEOUS at 21:31

## 2022-04-30 RX ADMIN — Medication 64.25 MILLIMOLE(S): at 13:12

## 2022-04-30 RX ADMIN — Medication 50 GRAM(S): at 17:11

## 2022-04-30 RX ADMIN — Medication 1000 MILLIGRAM(S): at 17:12

## 2022-04-30 RX ADMIN — HEPARIN SODIUM 5000 UNIT(S): 5000 INJECTION INTRAVENOUS; SUBCUTANEOUS at 13:13

## 2022-04-30 RX ADMIN — Medication 1000 MILLIGRAM(S): at 17:42

## 2022-04-30 RX ADMIN — Medication 1000 MILLIGRAM(S): at 11:06

## 2022-04-30 RX ADMIN — Medication 1000 MILLIGRAM(S): at 11:36

## 2022-04-30 RX ADMIN — HEPARIN SODIUM 5000 UNIT(S): 5000 INJECTION INTRAVENOUS; SUBCUTANEOUS at 05:29

## 2022-04-30 RX ADMIN — Medication 1000 MILLIGRAM(S): at 01:08

## 2022-04-30 RX ADMIN — Medication 1000 MILLIGRAM(S): at 22:55

## 2022-04-30 RX ADMIN — Medication 0.5 MILLIGRAM(S): at 00:08

## 2022-04-30 RX ADMIN — Medication 50 GRAM(S): at 11:07

## 2022-04-30 RX ADMIN — Medication 1000 MILLIGRAM(S): at 00:08

## 2022-05-01 ENCOUNTER — TRANSCRIPTION ENCOUNTER (OUTPATIENT)
Age: 37
End: 2022-05-01

## 2022-05-01 VITALS
DIASTOLIC BLOOD PRESSURE: 72 MMHG | OXYGEN SATURATION: 98 % | RESPIRATION RATE: 18 BRPM | HEART RATE: 65 BPM | TEMPERATURE: 99 F | SYSTOLIC BLOOD PRESSURE: 110 MMHG

## 2022-05-01 LAB
ANION GAP SERPL CALC-SCNC: 7 MMOL/L — SIGNIFICANT CHANGE UP (ref 5–17)
BUN SERPL-MCNC: 5 MG/DL — LOW (ref 7–23)
CALCIUM SERPL-MCNC: 7.9 MG/DL — LOW (ref 8.4–10.5)
CHLORIDE SERPL-SCNC: 106 MMOL/L — SIGNIFICANT CHANGE UP (ref 96–108)
CO2 SERPL-SCNC: 25 MMOL/L — SIGNIFICANT CHANGE UP (ref 22–31)
CREAT SERPL-MCNC: 0.68 MG/DL — SIGNIFICANT CHANGE UP (ref 0.5–1.3)
EGFR: 115 ML/MIN/1.73M2 — SIGNIFICANT CHANGE UP
GLUCOSE SERPL-MCNC: 80 MG/DL — SIGNIFICANT CHANGE UP (ref 70–99)
HCT VFR BLD CALC: 28.4 % — LOW (ref 34.5–45)
HGB BLD-MCNC: 9.3 G/DL — LOW (ref 11.5–15.5)
MAGNESIUM SERPL-MCNC: 2.1 MG/DL — SIGNIFICANT CHANGE UP (ref 1.6–2.6)
MCHC RBC-ENTMCNC: 27.6 PG — SIGNIFICANT CHANGE UP (ref 27–34)
MCHC RBC-ENTMCNC: 32.7 GM/DL — SIGNIFICANT CHANGE UP (ref 32–36)
MCV RBC AUTO: 84.3 FL — SIGNIFICANT CHANGE UP (ref 80–100)
NRBC # BLD: 0 /100 WBCS — SIGNIFICANT CHANGE UP (ref 0–0)
PHOSPHATE SERPL-MCNC: 2.4 MG/DL — LOW (ref 2.5–4.5)
PLATELET # BLD AUTO: 308 K/UL — SIGNIFICANT CHANGE UP (ref 150–400)
POTASSIUM SERPL-MCNC: 4.2 MMOL/L — SIGNIFICANT CHANGE UP (ref 3.5–5.3)
POTASSIUM SERPL-SCNC: 4.2 MMOL/L — SIGNIFICANT CHANGE UP (ref 3.5–5.3)
RBC # BLD: 3.37 M/UL — LOW (ref 3.8–5.2)
RBC # FLD: 13.2 % — SIGNIFICANT CHANGE UP (ref 10.3–14.5)
SODIUM SERPL-SCNC: 138 MMOL/L — SIGNIFICANT CHANGE UP (ref 135–145)
WBC # BLD: 6.71 K/UL — SIGNIFICANT CHANGE UP (ref 3.8–10.5)
WBC # FLD AUTO: 6.71 K/UL — SIGNIFICANT CHANGE UP (ref 3.8–10.5)

## 2022-05-01 RX ORDER — DOCUSATE SODIUM 100 MG
1 CAPSULE ORAL
Qty: 14 | Refills: 0
Start: 2022-05-01 | End: 2022-05-07

## 2022-05-01 RX ORDER — SODIUM,POTASSIUM PHOSPHATES 278-250MG
1 POWDER IN PACKET (EA) ORAL ONCE
Refills: 0 | Status: COMPLETED | OUTPATIENT
Start: 2022-05-01 | End: 2022-05-01

## 2022-05-01 RX ADMIN — Medication 1000 MILLIGRAM(S): at 10:26

## 2022-05-01 RX ADMIN — Medication 1 PACKET(S): at 10:25

## 2022-05-01 RX ADMIN — HEPARIN SODIUM 5000 UNIT(S): 5000 INJECTION INTRAVENOUS; SUBCUTANEOUS at 04:51

## 2022-05-01 RX ADMIN — SODIUM CHLORIDE 40 MILLILITER(S): 9 INJECTION, SOLUTION INTRAVENOUS at 04:51

## 2022-05-01 NOTE — PROGRESS NOTE ADULT - ASSESSMENT
36 year old female with PMH of anxiety, recently diagnosed colon cancer now s/p subtotal colectomy 4/27.    CLD/IVF  pain/nausea control standing and prn  SQH/SCDs  OOBA/IS  AM labs  
36 year old female with PMH of anxiety, recently diagnosed colon cancer now s/p subtotal colectomy 4/27.    CLD/IVF  pain/nausea control standing and prn  SQH/SCDs  OOBA/IS  Jaeger removed, TOV pending   AM labs  
36 year old female with PMH of anxiety, recently diagnosed colon cancer now s/p subtotal colectomy 4/27.    LRD/IVF  pain/nausea control standing and prn  SQH/SCDs  OOBA/IS  AM labs  Dc home today  
36 year old female with PMH of anxiety, recently diagnosed colon cancer now s/p subtotal colectomy 4/27.    CLD/IVF  pain/nausea control standing and prn  SQH/SCDs  OOBA/IS  AM labs

## 2022-05-01 NOTE — DISCHARGE NOTE NURSING/CASE MANAGEMENT/SOCIAL WORK - NSDCPEFALRISK_GEN_ALL_CORE
For information on Fall & Injury Prevention, visit: [email or web address] OR  [email or web address] OR  https://www.cdc.gov/steadi/patient.html

## 2022-05-01 NOTE — PROGRESS NOTE ADULT - SUBJECTIVE AND OBJECTIVE BOX
SUBJECTIVE: Patient seen and examined bedside. Pt reports that her abdominal pain is well controlled. Tolerating a CLD without associated nausea or vomiting. States she had x3 BMs overnight with dark red blood but the last BM was slightly brighter red in appearance.     heparin   Injectable 5000 Unit(s) SubCutaneous every 8 hours      Vital Signs Last 24 Hrs  T(C): 36.6 (30 Apr 2022 05:00), Max: 36.8 (29 Apr 2022 20:00)  T(F): 97.8 (30 Apr 2022 05:00), Max: 98.3 (29 Apr 2022 20:00)  HR: 75 (30 Apr 2022 05:00) (64 - 77)  BP: 122/86 (30 Apr 2022 05:00) (102/60 - 123/76)  BP(mean): --  RR: 18 (30 Apr 2022 05:00) (16 - 18)  SpO2: 98% (30 Apr 2022 05:00) (95% - 100%)  I&O's Detail    29 Apr 2022 07:01  -  30 Apr 2022 07:00  --------------------------------------------------------  IN:    IV PiggyBack: 250 mL    Lactated Ringers: 760 mL    Oral Fluid: 780 mL  Total IN: 1790 mL    OUT:    Voided (mL): 400 mL  Total OUT: 400 mL    Total NET: 1390 mL          General: NAD, resting comfortably in bed  C/V: NSR  Pulm: Nonlabored breathing, no respiratory distress  Abd: soft, mildly distended, appropriate incisional TTP, incisions CDI, no rebound, no guarding  Rectum: scant dried dark red blood around rectum  Extrem: WWP, no edema, SCDs in place        LABS:                        9.4    6.25  )-----------( 280      ( 30 Apr 2022 06:31 )             29.1     04-30    140  |  108  |  6<L>  ----------------------------<  79  4.2   |  23  |  0.66    Ca    8.1<L>      30 Apr 2022 06:31  Phos  1.9     04-30  Mg     1.5     04-30            RADIOLOGY & ADDITIONAL STUDIES:  
Procedure: subtotal colectomy    Surgeon: Yuko    S: Pt has no complaints. Denies CP, SOB, NAIDU, calf tenderness. Pain controlled with medication. 500cc bolused for low UOP    O:  T(C): 36.6 (04-27-22 @ 12:05), Max: 36.6 (04-27-22 @ 12:05)  T(F): 97.8 (04-27-22 @ 12:05), Max: 97.8 (04-27-22 @ 12:05)  HR: 94 (04-27-22 @ 13:05) (87 - 110)  BP: 121/74 (04-27-22 @ 13:05) (113/67 - 121/74)  RR: 15 (04-27-22 @ 13:05) (12 - 18)  SpO2: 100% (04-27-22 @ 13:05) (100% - 100%)  Wt(kg): --            Gen: NAD, resting comfortably in bed  C/V: NSR  Pulm: Nonlabored breathing, no respiratory distress  Abd: soft, NT/ND Incision: CDI  Extrem: WWP, no calf edema, SCDs in place      A/P: 36yFemale s/p subtotal colectomy   Diet: CLD  IVF: LR@40  Pain/nausea control  DVT ppx: SCDs, SQH  Jaeger to gravity  
INTERVAL HPI/OVERNIGHT EVENTS: 2 bloody BMs    STATUS POST:  subtotal colectomy    POST OPERATIVE DAY #: 1    SUBJECTIVE:  seen and examined at bedside This morning, she feels well; her pain is well-controlled. blake clears. No nausea or vomiting. + bloody BMs, -Flatus. No acute complaints.      MEDICATIONS  (STANDING):  acetaminophen     Tablet .. 1000 milliGRAM(s) Oral every 6 hours  clonazePAM  Tablet 0.5 milliGRAM(s) Oral three times a day  Ethinyl Estradiol 0.03 milliGRAM(s),Levonorgestrel 0.15 milliGRAM(s) 0.18 milliGRAM(s) Oral daily  heparin   Injectable 5000 Unit(s) SubCutaneous every 8 hours  lactated ringers. 1000 milliLiter(s) (40 mL/Hr) IV Continuous <Continuous>  potassium phosphate IVPB 15 milliMole(s) IV Intermittent once  sertraline 50 milliGRAM(s) Oral daily    MEDICATIONS  (PRN):  HYDROmorphone  Injectable 0.5 milliGRAM(s) IV Push every 4 hours PRN Severe Pain (7 - 10)  ondansetron Injectable 4 milliGRAM(s) IV Push every 6 hours PRN Nausea and/or Vomiting      Vital Signs Last 24 Hrs  T(C): 36.4 (29 Apr 2022 08:30), Max: 36.9 (28 Apr 2022 12:58)  T(F): 97.5 (29 Apr 2022 08:30), Max: 98.5 (29 Apr 2022 00:09)  HR: 77 (29 Apr 2022 08:30) (74 - 96)  BP: 123/76 (29 Apr 2022 08:30) (105/67 - 129/77)  BP(mean): --  RR: 17 (29 Apr 2022 08:30) (16 - 18)  SpO2: 98% (29 Apr 2022 08:30) (97% - 100%)    PHYSICAL EXAM:      Constitutional: A&Ox3    Respiratory: non labored breathing, no respiratory distress    Cardiovascular: NSR, RRR    Gastrointestinal: abd soft, non-distended, non-tended, no guarding/rebound                 Incision: c/d/i w dermabond    Extremities: (-) edema, - calf ttp                  I&O's Detail    28 Apr 2022 07:01  -  29 Apr 2022 07:00  --------------------------------------------------------  IN:    IV PiggyBack: 100 mL    IV PiggyBack: 250 mL    Lactated Ringers: 680 mL    Oral Fluid: 120 mL  Total IN: 1150 mL    OUT:    Voided (mL): 1300 mL  Total OUT: 1300 mL    Total NET: -150 mL          LABS:                        10.5   7.40  )-----------( 289      ( 29 Apr 2022 06:38 )             32.7     04-29    140  |  106  |  8   ----------------------------<  84  4.6   |  26  |  0.76    Ca    8.2<L>      29 Apr 2022 06:35  Phos  2.0     04-29  Mg     2.0     04-29            RADIOLOGY & ADDITIONAL STUDIES:
INTERVAL HPI/OVERNIGHT EVENTS: MIRIAN, VSS     STATUS POST:    4/27: subtotal colectomy     POST OPERATIVE DAY #: 4    SUBJECTIVE: Pt seen and examined at bedside this am by surgery team. Tolerating diet, pain well controlled. +F/+nonbloody BMs. Denies f/n/v/cp/sob.    MEDICATIONS  (STANDING):  acetaminophen     Tablet .. 1000 milliGRAM(s) Oral every 6 hours  clonazePAM  Tablet 0.5 milliGRAM(s) Oral three times a day  Ethinyl Estradiol 0.03 milliGRAM(s),Levonorgestrel 0.15 milliGRAM(s) 0.18 milliGRAM(s) Oral daily  heparin   Injectable 5000 Unit(s) SubCutaneous every 8 hours  lactated ringers. 1000 milliLiter(s) (40 mL/Hr) IV Continuous <Continuous>  sertraline 50 milliGRAM(s) Oral daily    MEDICATIONS  (PRN):  ondansetron Injectable 4 milliGRAM(s) IV Push every 6 hours PRN Nausea and/or Vomiting  oxyCODONE    IR 5 milliGRAM(s) Oral every 6 hours PRN Severe Pain (7 - 10)    Vital Signs Last 24 Hrs  T(C): 36.3 (01 May 2022 11:06), Max: 36.6 (30 Apr 2022 17:13)  T(F): 97.3 (01 May 2022 11:06), Max: 97.8 (30 Apr 2022 17:13)  HR: 78 (01 May 2022 11:06) (67 - 80)  BP: 118/70 (01 May 2022 11:06) (111/71 - 121/73)  BP(mean): --  RR: 17 (01 May 2022 11:06) (17 - 18)  SpO2: 99% (01 May 2022 11:06) (97% - 99%)    PHYSICAL EXAM:    Constitutional: A&Ox3, NAD    Respiratory: non labored breathing, no respiratory distress    Cardiovascular: NSR, RRR    Gastrointestinal: abdomen soft, nd, appropriately ttp to incisions. Dressings c/d/i.     Extremities: wwp, no calf tenderness or edema. SCDs in place       I&O's Detail    30 Apr 2022 07:01  -  01 May 2022 07:00  --------------------------------------------------------  IN:    IV PiggyBack: 250 mL    IV PiggyBack: 100 mL    Lactated Ringers: 600 mL    Oral Fluid: 360 mL  Total IN: 1310 mL    OUT:    Voided (mL): 200 mL  Total OUT: 200 mL    Total NET: 1110 mL          LABS:                        9.3    6.71  )-----------( 308      ( 01 May 2022 07:10 )             28.4     05-01    138  |  106  |  5<L>  ----------------------------<  80  4.2   |  25  |  0.68    Ca    7.9<L>      01 May 2022 07:10  Phos  2.4     05-01  Mg     2.1     05-01            RADIOLOGY & ADDITIONAL STUDIES:
INTERVAL HPI/OVERNIGHT EVENTS: restarted BC, patient does not want to move, -n/v, -f/bm, nery, vss    STATUS POST:  4/27: subtotal colectomy     POST OPERATIVE DAY #: 1    SUBJECTIVE: Pt seen and examined at bedside this am by surgery team. No acute complaints. Tolerating diet, pain well controlled. -F/-BM. Denies f/n/v/cp/sob.    MEDICATIONS  (STANDING):  acetaminophen     Tablet .. 1000 milliGRAM(s) Oral every 6 hours  clonazePAM  Tablet 0.5 milliGRAM(s) Oral three times a day  Ethinyl Estradiol 0.03 milliGRAM(s),Levonorgestrel 0.15 milliGRAM(s) 0.18 milliGRAM(s) Oral daily  heparin   Injectable 5000 Unit(s) SubCutaneous every 8 hours  ketorolac   Injectable 15 milliGRAM(s) IV Push every 6 hours  lactated ringers. 1000 milliLiter(s) (40 mL/Hr) IV Continuous <Continuous>  magnesium sulfate  IVPB 1 Gram(s) IV Intermittent once  sertraline 50 milliGRAM(s) Oral daily  sodium phosphate IVPB 15 milliMole(s) IV Intermittent once    MEDICATIONS  (PRN):  HYDROmorphone  Injectable 0.5 milliGRAM(s) IV Push every 4 hours PRN Severe Pain (7 - 10)  ondansetron Injectable 4 milliGRAM(s) IV Push every 6 hours PRN Nausea and/or Vomiting    Vital Signs Last 24 Hrs  T(C): 37 (28 Apr 2022 04:52), Max: 37.3 (27 Apr 2022 23:43)  T(F): 98.6 (28 Apr 2022 04:52), Max: 99.2 (27 Apr 2022 23:43)  HR: 82 (28 Apr 2022 04:52) (63 - 110)  BP: 119/75 (28 Apr 2022 04:52) (113/67 - 130/72)  BP(mean): 95 (27 Apr 2022 14:05) (85 - 97)  RR: 17 (28 Apr 2022 04:52) (12 - 18)  SpO2: 96% (28 Apr 2022 04:52) (94% - 100%)    PHYSICAL EXAM:    Constitutional: A&Ox3, NAD     Respiratory: non labored breathing, no respiratory distress    Cardiovascular: NSR, RRR    Gastrointestinal: abdomen soft, NT/ND. Incision C/D/I    Extremities: wwp, no calf tenderness or edema. SCDs in place     I&O's Detail    27 Apr 2022 07:01  -  28 Apr 2022 07:00  --------------------------------------------------------  IN:    IV PiggyBack: 100 mL    Lactated Ringers: 790 mL    Lactated Ringers Bolus: 500 mL    Oral Fluid: 180 mL  Total IN: 1570 mL    OUT:    Indwelling Catheter - Urethral (mL): 710 mL  Total OUT: 710 mL    Total NET: 860 mL          LABS:                        11.2   8.86  )-----------( 293      ( 28 Apr 2022 07:14 )             34.3     04-28    139  |  106  |  7   ----------------------------<  86  4.4   |  23  |  0.77    Ca    8.2<L>      28 Apr 2022 07:14  Phos  2.2     04-28  Mg     1.8     04-28            RADIOLOGY & ADDITIONAL STUDIES:

## 2022-05-01 NOTE — DISCHARGE NOTE NURSING/CASE MANAGEMENT/SOCIAL WORK - PATIENT PORTAL LINK FT
You can access the FollowMyHealth Patient Portal offered by [facility] by registering at the following website: [email or web address]. By joining [facility]’s FollowMyHealth portal, you will also be able to view your health information using other applications (apps) compatible with our system.

## 2022-05-01 NOTE — DISCHARGE NOTE NURSING/CASE MANAGEMENT/SOCIAL WORK - NSDCPETBCESMAN_GEN_ALL_CORE
Patient took last dose today.      Controlled Substance Refill Request  Medication Name:   Requested Prescriptions     Pending Prescriptions Disp Refills     dextroamphetamine-amphetamine (ADDERALL XR) 25 MG 24 hr capsule 30 capsule 0     Sig: Take 1 capsule (25 mg total) by mouth daily. In morning     dextroamphetamine-amphetamine (ADDERALL) 10 mg Tab tablet 30 tablet 0     Sig: Take 1 tablet by mouth daily. In afternoon     Date Last Fill: 2/5/19 for both drugs  Pharmacy: [id number]  Submit electronically to pharmacy  Controlled Substance Agreement Date Scanned:   Encounter-Level CSA Scan Date:    There are no encounter-level csa scan date.       Last office visit with prescriber/PCP: 11/20/2018 Kristen Royal MD OR same dept: 11/20/2018 Kristen Royal MD OR same specialty: 11/20/2018 Kristen Royal MD  Last physical: Visit date not found Last MTM visit: Visit date not found    
If you are a smoker, it is important for your health to stop smoking. Please be aware that second hand smoke is also harmful.

## 2022-05-03 PROBLEM — D49.0 NEOPLASM OF UNSPECIFIED BEHAVIOR OF DIGESTIVE SYSTEM: Chronic | Status: ACTIVE | Noted: 2022-04-26

## 2022-05-05 DIAGNOSIS — E83.39 OTHER DISORDERS OF PHOSPHORUS METABOLISM: ICD-10-CM

## 2022-05-05 DIAGNOSIS — C18.4 MALIGNANT NEOPLASM OF TRANSVERSE COLON: ICD-10-CM

## 2022-05-05 DIAGNOSIS — F41.9 ANXIETY DISORDER, UNSPECIFIED: ICD-10-CM

## 2022-05-06 LAB — SURGICAL PATHOLOGY STUDY: SIGNIFICANT CHANGE UP

## 2022-05-13 ENCOUNTER — APPOINTMENT (OUTPATIENT)
Dept: COLORECTAL SURGERY | Facility: CLINIC | Age: 37
End: 2022-05-13
Payer: MEDICAID

## 2022-05-13 VITALS
TEMPERATURE: 97.3 F | HEIGHT: 61 IN | WEIGHT: 108 LBS | HEART RATE: 69 BPM | BODY MASS INDEX: 20.39 KG/M2 | DIASTOLIC BLOOD PRESSURE: 80 MMHG | SYSTOLIC BLOOD PRESSURE: 124 MMHG

## 2022-05-13 PROCEDURE — 99024 POSTOP FOLLOW-UP VISIT: CPT

## 2022-05-13 RX ORDER — SERTRALINE HYDROCHLORIDE 25 MG/1
TABLET, FILM COATED ORAL
Refills: 0 | Status: ACTIVE | COMMUNITY

## 2022-05-13 NOTE — HISTORY OF PRESENT ILLNESS
[FreeTextEntry1] : Pt is a 35 yo F who presents to office for f/u colon cancer \par H/o anxiety\par \par Pt initially seen by [facility] [place] [facility] 3/16/21 for 6 months of intermittent abdominal pain per pt described "stabbing quality with change in BH, intermittent soft stool with cramping urgency" BRBPR . \par \par Pt underwent initial screening colonoscopy on 4/5/22:\par Ulcerated, friable circumferential lesion in midtransverse colon, biopsies were taken and distal margin was marked with two Rosario ink tattoos. \par Minute sessile polyp removed with cold biopsy from sigmoid colon\par Internal hemorrhoids at anal verge\par Otherwise normal coloscopy to the terminal ileum\par Impression: \par Colonic lesion consistent with malignancy\par \par Pathology:\par Colon, transverse, mass biopsy: Infiltrating moderately differentiated colonic adenocarcinoma\par Colon, sigmoid polyp: Small inflammatory type polyp. No adenoma appreciated\par \par MMR proteins intact. \par \par Referred for CT A/P 4/1/22 (completed at [facility]):\par Impression: \par 5 cm circumferential bowel wall thickening of the transverse colon highly suspicious for primary colonic malignancy\par Adjacent necrotic appearing lymphadenopathy highly suspicious for metastatic disease \par Indeterminate 5 mm hypoenhancing lesions in the left lobe of the liver \par No large bowel obstruction\par \par \par Prior imaging includes Abdominal US completed [facility] 2/4/22\par Impression: \par 4 mm gallbladder polyp which based on ACR guidelines does not require further evaluation. \par \par \par Pt was seen in office for initial evaluation of colon cancer 4/11/22. No abdominal tenderness or masses appreciated on exam. CT of chest was recommended for completion of initial staging.  Recommendation to proceed with robotic assisted extended right hemicolectomy. \par \par CT chest 4/13/22:\par No evidence of metastasis. \par \par \par s/p Robotic-assisted subtotal colectomy 4/29\par Hospital course 4/27-5/1/22 without complications.\par \par Surgical Pathology Report - Auth (Verified)\par \par Specimen(s) Submitted\par 1  Transverse ascending cecum and terminal ileum\par \par Final Diagnosis\par Colon, right hemicolectomey:\par - Invasive, moderately differentiated, mucinous adenocarcinoma, measuring\par 9 cm.\par - Invasive carcinoma extends through the muscularis propria into\par \par pericolonic soft\par tissue.\par - Hyperplastic polyp.\par - Margins are negative for tumor.\par - One out of sixty-three lymph nodes positive for tumor (1/63).\par \par Synoptic Summary\par COLON AND RECTUM: Resection, Including Transanal Disk Excision of Rectal\par Neoplasms\par SPECIMEN\par Procedure:  Right hemicolectomy\par Macroscopic Evaluation of Mesorectum:   Not applicable\par TUMOR\par Tumor Site: Ascending colon\par Histologic Type:  Mucinous adenocarcinoma\par Histologic Grade:  G2, moderately differentiated\par Tumor Size: Greatest dimension (Centimeters) -   9 x  5 x 3 cm\par Multiple Primary Sites:  Not applicable\par Tumor Extent:  Invades through muscularis propria into pericolorectal\par tissue\par Macroscopic Tumor Perforation:   Not identified\par Lymphovascular Invasion:  Not identified\par Perineural Invasion:  Not identified\par Treatment Effect:  No known presurgical therapy\par \par MARGINS\par Margin Status for Invasive Carcinoma:   All margins negative for invasive\par carcinoma\par Distance from Invasive Carcinoma to Radial (Circumferential)\par Margin:  Not applicable\par Distance from Invasive Carcinoma to Closest Mucosal Margin:    Not\par applicable\par Margin Status for Non-Invasive Tumor:   All margins negative for high-\par grade dysplasia / intramucosal carcinoma and low-grade dysplasia\par \par REGIONAL LYMPH NODES\par Regional Lymph Node Status:  Tumor present in regional lymph node\par Number of Lymph Nodes with Tumor -   1\par Number of Lymph Nodes Examined:   63\par Not identified\par Tumor Deposits:\par \par DISTANT METASTASIS\par Distant Site(s) Involved:  Not applicable\par PATHOLOGIC STAGE CLASSIFICATION (pTNM, AJCC 8th Edition)\par Reporting of pT, pN, and (when applicable) pM categories is based\par on information available to the pathologist at the time the report\par is issued. As per the AJCC (Chapter 1, 8th Ed.) it is the managing\par physician's responsibility to establish the final pathologic stage based\par upon all pertinent information, including but potentially not limited to\par this pathology report.\par TNM Descriptors: Not applicable\par pT Category:  pT3\par pN Category:  pN1a\par pM Category:  Not applicable - pM cannot be determined from the\par submitted specimen(s)\par \par Clinical Information\par Malignant neoplasm of colon\par \par Gross Description\par Received:  Fresh labeled  "transverse ascending cecum and terminal ileum"\par Integrity:  Intact\par Orientation:  Oriented by anatomic landmarks\par Proximal:  Stapled, 6.0 cm in circumference\par Distal:  Stapled, 5.0 cm in circumference\par Terminal Ileum\par Length:  4.0 cm\par \par Circumference:  6.0 cm\par Wall Thickness:  0.2-0.3 cm\par Mesenteric Width:  3.5 cm\par Colon\par Length:  60.0 cm\par Cecum Circumference:  4.5 x 3.0 cm\par Colonic Circumference:   5.0-8.0 cm\par Mesenteric Width:  6.5 cm\par Wall Thickness:  0.2-0.5 cm\par Pedicle:  Not Present\par Appendix: Size:  6.0 cm in length, 0.5-0.7 cm in diameter\par Appendix Appearance:  Unremarkable\par Inking\par Proximal:  Blue\par Serosa:  Green\par Distal:  Orange\par Mesenteric Resection Margin:   Blue\par Posterior Retroperitoneal Margin:   Orange\par Area of Interest 1 -   mass\par Size:  9.0 x 5.0 x 3.0 cm, pink-gray polypoid, raised, firm\par circumferential\par Location:  Terminal ileum, ascending colon\par % Circumferential Involvement:   100%\par Overlying Serosa:  Contracted\par Cut Surface:  White and firm\par Extension:  Extends through wall 1.5 cm into the mesenteric adipose\par tissue.\par Distance to Proximal Margin:   24.0 cm\par Distance to Distal Margin:   32.0 cm\par Distance to Additional Margins/Landmarks\par Mesenteric:  7.5 cm\par Area of Interest 2 -   polyp\par Size:  0.6 x 0.5 x 0.5 cm, red-tan ovoid\par Location:  Ascending colon\par Cut Surface:  Tan-red, smooth\par Extension:  Confined to the mucosa\par Distance to Proximal Margin:   24.0 cm\par Distance to Distal Margin:   35.0 cm\par 8.0 cm proximal to area of interest #1\par Remaining Mucosa:  Pink-tan and unremarkable\par Remaining Serosa:  Pink-tan, smooth and unremarkable\par Lumen:  No abnormality\par Omentum:  30.0 x 15.0 x 0.8 cm, yellow and soft\par Attached Mesentery:  Yellow, soft and intact\par Possible Lymph Node(s):   Multiple , 0.1 cm to 2.2 cm in greatest dimension\par Cut surfaces:  Tan-pink\par Submitted:  Representative sections in 29 cassettes:\par 1A: Proximal margin, en face\par 1B: Distal Margin, en face\par 1C: Appendix\par 1D: Omentum\par 1E: Mesenteric margin\par 1F: Terminal ileum, unremarkable\par 1G: Colon, unremarkable\par 1H-1I: Mass, deepest invasion, one section bisected\par 1J-1K: Mass, deepest invasion, one section bisected\par \par 1L: Mass to unremarkable mucosa\par 1M: Mass\par 1N: Polyp\par 1O: One lymph node, serially sectioned\par 1P: One lymph node, bisected\par 1Q: One lymph node, bisected\par 1R: One lymph node, bisected\par 1S: One lymph node, bisected\par 1T: One lymph node, bisected\par 1U-1AC: Multiple lymph nodes\par \par \par \par Pt reports feeling well, feeling more like herself. reports pain 1 week after surgery for which she took tylenol daily with relief. Reports only feels pain with palpation of abdomen, otherwise has improved. Pt took tylenol today preemptively. \par Reports eating mainly bread, fish, chicken, potatoes, started eating cooked vegetables in the last week \par Reports some oozing from surgical site the first week, which has resolved. Cleaning with soap and water. \par \par BH: 1-2x daily, no straining, soft formed stools \par denies use of stool softener, fiber supplements \par \par

## 2022-05-13 NOTE — ASSESSMENT
[FreeTextEntry1] : Stage III colon cancer.  MMR intact.\par \par Advise postoperative adjuvant chemotherapy.  Recommend oncology consultation.  Advised return to GI in 1 year for surveillance colonoscopy.  Recommend her that her children get colonoscopy at age 27.\par \par Liberalize diet and activity.\par \par All questions answered

## 2022-05-13 NOTE — PHYSICAL EXAM
[Abdomen Masses] : No abdominal masses [Abdomen Tenderness] : ~T No ~M abdominal tenderness [No HSM] : no hepatosplenomegaly [JVD] : no jugular venous distention  [Normal Breath Sounds] : Normal breath sounds [Normal Heart Sounds] : normal heart sounds [de-identified] : Abdomen is soft, nontender, nondistended. Incisions are well healed. No hernia or masses\par

## 2022-05-15 ENCOUNTER — TRANSCRIPTION ENCOUNTER (OUTPATIENT)
Age: 37
End: 2022-05-15

## 2022-05-30 PROCEDURE — 88309 TISSUE EXAM BY PATHOLOGIST: CPT

## 2022-05-30 PROCEDURE — 86900 BLOOD TYPING SEROLOGIC ABO: CPT

## 2022-05-30 PROCEDURE — 86850 RBC ANTIBODY SCREEN: CPT

## 2022-05-30 PROCEDURE — 86901 BLOOD TYPING SEROLOGIC RH(D): CPT

## 2022-05-30 PROCEDURE — 88341 IMHCHEM/IMCYTCHM EA ADD ANTB: CPT

## 2022-05-30 PROCEDURE — 36415 COLL VENOUS BLD VENIPUNCTURE: CPT

## 2022-05-30 PROCEDURE — 83735 ASSAY OF MAGNESIUM: CPT

## 2022-05-30 PROCEDURE — C1889: CPT

## 2022-05-30 PROCEDURE — 80048 BASIC METABOLIC PNL TOTAL CA: CPT

## 2022-05-30 PROCEDURE — S2900: CPT

## 2022-05-30 PROCEDURE — 85027 COMPLETE CBC AUTOMATED: CPT

## 2022-05-30 PROCEDURE — 84100 ASSAY OF PHOSPHORUS: CPT

## 2022-06-03 ENCOUNTER — APPOINTMENT (OUTPATIENT)
Dept: COLORECTAL SURGERY | Facility: CLINIC | Age: 37
End: 2022-06-03
Payer: MEDICAID

## 2022-06-03 VITALS
BODY MASS INDEX: 20.96 KG/M2 | HEIGHT: 61 IN | DIASTOLIC BLOOD PRESSURE: 86 MMHG | WEIGHT: 111 LBS | TEMPERATURE: 98.2 F | SYSTOLIC BLOOD PRESSURE: 137 MMHG | HEART RATE: 77 BPM

## 2022-06-03 PROCEDURE — 99024 POSTOP FOLLOW-UP VISIT: CPT

## 2022-06-03 NOTE — HISTORY OF PRESENT ILLNESS
[FreeTextEntry1] : Pt is a 37 yo F with stage III colon cancer presents today for second post-op follow up. s/p Robotic-assisted subtotal colectomy 4/29\par \par \par \par Pt initially seen by [facility] 3/16/21 for 6 months of intermittent abdominal pain per pt described "stabbing quality with change in BH, intermittent soft stool with cramping urgency" BRBPR . \par \par Pt underwent initial screening colonoscopy on 4/5/22:\par Ulcerated, friable circumferential lesion in midtransverse colon, biopsies were taken and distal margin was marked with two Rosario ink tattoos. \par Minute sessile polyp removed with cold biopsy from sigmoid colon\par Internal hemorrhoids at anal verge\par Otherwise normal coloscopy to the terminal ileum\par Impression: \par Colonic lesion consistent with malignancy\par \par Pathology:\par Colon, transverse, mass biopsy: Infiltrating moderately differentiated colonic adenocarcinoma\par Colon, sigmoid polyp: Small inflammatory type polyp. No adenoma appreciated\par \par MMR proteins intact. \par \par Referred for CT A/P 4/1/22 (completed at [facility]):\par Impression: \par 5 cm circumferential bowel wall thickening of the transverse colon highly suspicious for primary colonic malignancy\par Adjacent necrotic appearing lymphadenopathy highly suspicious for metastatic disease \par Indeterminate 5 mm hypoenhancing lesions in the left lobe of the liver \par No large bowel obstruction\par \par \par Prior imaging includes Abdominal US completed [facility] 2/4/22\par Impression: \par 4 mm gallbladder polyp which based on ACR guidelines does not require further evaluation. \par \par \par Pt was seen in office for initial evaluation of colon cancer 4/11/22. No abdominal tenderness or masses appreciated on exam. CT of chest was recommended for completion of initial staging. Recommendation to proceed with robotic assisted extended right hemicolectomy. \par \par CT chest 4/13/22:\par No evidence of metastasis. \par \par \par s/p Robotic-assisted subtotal colectomy 4/29\par Hospital course 4/27-5/1/22 without complications.\par \par Surgical Pathology Report - Auth (Verified)\par \par Specimen(s) Submitted\par 1 Transverse ascending cecum and terminal ileum\par \par Final Diagnosis\par Colon, right hemicolectomey:\par - Invasive, moderately differentiated, mucinous adenocarcinoma, measuring\par 9 cm.\par - Invasive carcinoma extends through the muscularis propria into\par \par pericolonic soft\par tissue.\par - Hyperplastic polyp.\par - Margins are negative for tumor.\par - One out of sixty-three lymph nodes positive for tumor (1/63).\par \par Synoptic Summary\par COLON AND RECTUM: Resection, Including Transanal Disk Excision of Rectal\par Neoplasms\par SPECIMEN\par Procedure: Right hemicolectomy\par Macroscopic Evaluation of Mesorectum: Not applicable\par TUMOR\par Tumor Site: Ascending colon\par Histologic Type: Mucinous adenocarcinoma\par Histologic Grade: G2, moderately differentiated\par Tumor Size: Greatest dimension (Centimeters) - 9 x 5 x 3 cm\par Multiple Primary Sites: Not applicable\par Tumor Extent: Invades through muscularis propria into pericolorectal\par tissue\par Macroscopic Tumor Perforation: Not identified\par Lymphovascular Invasion: Not identified\par Perineural Invasion: Not identified\par Treatment Effect: No known presurgical therapy\par \par MARGINS\par Margin Status for Invasive Carcinoma: All margins negative for invasive\par carcinoma\par Distance from Invasive Carcinoma to Radial (Circumferential)\par Margin: Not applicable\par Distance from Invasive Carcinoma to Closest Mucosal Margin: Not\par applicable\par Margin Status for Non-Invasive Tumor: All margins negative for high-\par grade dysplasia / intramucosal carcinoma and low-grade dysplasia\par \par REGIONAL LYMPH NODES\par Regional Lymph Node Status: Tumor present in regional lymph node\par Number of Lymph Nodes with Tumor - 1\par Number of Lymph Nodes Examined: 63\par Not identified\par Tumor Deposits:\par \par DISTANT METASTASIS\par Distant Site(s) Involved: Not applicable\par PATHOLOGIC STAGE CLASSIFICATION (pTNM, AJCC 8th Edition)\par Reporting of pT, pN, and (when applicable) pM categories is based\par on information available to the pathologist at the time the report\par is issued. As per the AJCC (Chapter 1, 8th Ed.) it is the managing\par physician's responsibility to establish the final pathologic stage based\par upon all pertinent information, including but potentially not limited to\par this pathology report.\par TNM Descriptors: Not applicable\par pT Category: pT3\par pN Category: pN1a\par pM Category: Not applicable - pM cannot be determined from the\par submitted specimen(s)\par \par Clinical Information\par Malignant neoplasm of colon\par \par Gross Description\par Received: Fresh labeled "transverse ascending cecum and terminal ileum"\par Integrity: Intact\par Orientation: Oriented by anatomic landmarks\par Proximal: Stapled, 6.0 cm in circumference\par Distal: Stapled, 5.0 cm in circumference\par Terminal Ileum\par Length: 4.0 cm\par \par Circumference: 6.0 cm\par Wall Thickness: 0.2-0.3 cm\par Mesenteric Width: 3.5 cm\par Colon\par Length: 60.0 cm\par Cecum Circumference: 4.5 x 3.0 cm\par Colonic Circumference: 5.0-8.0 cm\par Mesenteric Width: 6.5 cm\par Wall Thickness: 0.2-0.5 cm\par Pedicle: Not Present\par Appendix: Size: 6.0 cm in length, 0.5-0.7 cm in diameter\par Appendix Appearance: Unremarkable\par Inking\par Proximal: Blue\par Serosa: Green\par Distal: Orange\par Mesenteric Resection Margin: Blue\par Posterior Retroperitoneal Margin: Orange\par Area of Interest 1 - mass\par Size: 9.0 x 5.0 x 3.0 cm, pink-gray polypoid, raised, firm\par circumferential\par Location: Terminal ileum, ascending colon\par % Circumferential Involvement: 100%\par Overlying Serosa: Contracted\par Cut Surface: White and firm\par Extension: Extends through wall 1.5 cm into the mesenteric adipose\par tissue.\par Distance to Proximal Margin: 24.0 cm\par Distance to Distal Margin: 32.0 cm\par Distance to Additional Margins/Landmarks\par Mesenteric: 7.5 cm\par Area of Interest 2 - polyp\par Size: 0.6 x 0.5 x 0.5 cm, red-tan ovoid\par Location: Ascending colon\par Cut Surface: Tan-red, smooth\par Extension: Confined to the mucosa\par Distance to Proximal Margin: 24.0 cm\par Distance to Distal Margin: 35.0 cm\par 8.0 cm proximal to area of interest #1\par Remaining Mucosa: Pink-tan and unremarkable\par Remaining Serosa: Pink-tan, smooth and unremarkable\par Lumen: No abnormality\par Omentum: 30.0 x 15.0 x 0.8 cm, yellow and soft\par Attached Mesentery: Yellow, soft and intact\par Possible Lymph Node(s): Multiple , 0.1 cm to 2.2 cm in greatest dimension\par Cut surfaces: Tan-pink\par Submitted: Representative sections in 29 cassettes:\par 1A: Proximal margin, en face\par 1B: Distal Margin, en face\par 1C: Appendix\par 1D: Omentum\par 1E: Mesenteric margin\par 1F: Terminal ileum, unremarkable\par 1G: Colon, unremarkable\par 1H-1I: Mass, deepest invasion, one section bisected\par 1J-1K: Mass, deepest invasion, one section bisected\par \par 1L: Mass to unremarkable mucosa\par 1M: Mass\par 1N: Polyp\par 1O: One lymph node, serially sectioned\par 1P: One lymph node, bisected\par 1Q: One lymph node, bisected\par 1R: One lymph node, bisected\par 1S: One lymph node, bisected\par 1T: One lymph node, bisected\par 1U-1AC: Multiple lymph nodes\par \par \par Pt last seen 5/13/22 for first post-operative visit, exam revealed no abdominal tenderness. Incisions well healed. Pt reported feeling well, was starting to expand diet to increase fiber, reported appropriate wound care. \par Pt was advised of postoperative adjuvant chemotherapy, and for an oncology consultation. \par Pt advised to return to GI for surveillance colonoscopy in 1 year and for her children to get colonoscopy at age 26.\par \par Pt presents today stating she is feeling well, no abdominal pain, eating regular diet.\par pt reports she feels a bump an inch or two above central abdominal incision site, she reports she noticed once swelling went down after surgery. Did not notice this at last visit. Does not report increase in size or pain to the area. \par \par denies fever, chills, body aches, discharge, erythema to incision sites. \par \par Pt went to see oncologist Dr. Laguna last Wednesday 5/25 with plan to start adjuvant chemotherapy 6/14/22.\par \par BH: 1-2x daily, pt reports constipation at baseline\par Has been eating more vegetables, drinking water. \par

## 2022-06-03 NOTE — PHYSICAL EXAM
[Abdomen Masses] : No abdominal masses [Abdomen Tenderness] : ~T No ~M abdominal tenderness [No HSM] : no hepatosplenomegaly [JVD] : no jugular venous distention  [Normal Breath Sounds] : Normal breath sounds [Normal Heart Sounds] : normal heart sounds [de-identified] : Abdomen is soft, nontender, nondistended. Incisions are well healed. No hernia or masses\par

## 2023-06-08 ENCOUNTER — NON-APPOINTMENT (OUTPATIENT)
Age: 38
End: 2023-06-08

## 2023-06-23 ENCOUNTER — APPOINTMENT (OUTPATIENT)
Dept: COLORECTAL SURGERY | Facility: CLINIC | Age: 38
End: 2023-06-23
Payer: MEDICAID

## 2023-06-23 VITALS
WEIGHT: 143 LBS | TEMPERATURE: 97.8 F | DIASTOLIC BLOOD PRESSURE: 81 MMHG | HEART RATE: 70 BPM | HEIGHT: 61 IN | BODY MASS INDEX: 27 KG/M2 | SYSTOLIC BLOOD PRESSURE: 120 MMHG

## 2023-06-23 DIAGNOSIS — C18.9 MALIGNANT NEOPLASM OF COLON, UNSPECIFIED: ICD-10-CM

## 2023-06-23 PROCEDURE — 99214 OFFICE O/P EST MOD 30 MIN: CPT

## 2023-06-23 NOTE — ASSESSMENT
[FreeTextEntry1] : Clinically SILVANA.\par \par 1 year status post robotic's right hemicolectomy for stage III right colon cancer.\par \par Advise return to primary GI for surveillance colonoscopy.  Continue with medical oncology follow-up.

## 2023-06-23 NOTE — HISTORY OF PRESENT ILLNESS
[FreeTextEntry1] : 37 yo F presents for follow up stage III colon CA\par s/p right hemicolectomy on 4/29/22, pT3N1a  (+) 1/63 LNs colon CA, MMR intact\par \par Initial CEA part of pre-op clearance 13.4 (4/13/22)\par \par Staging imaging:\par CT A/P 4/1/22 (completed at [facility]):\par Impression: \par 5 cm circumferential bowel wall thickening of the transverse colon highly suspicious for primary colonic malignancy\par Adjacent necrotic appearing lymphadenopathy highly suspicious for metastatic disease \par Indeterminate 5 mm hypoenhancing lesions in the left lobe of the liver \par No large bowel obstruction\par \par CT chest 4/13/22:\par No evidence of metastasis. \par \par Seen by [facility] 5/25 with plans to start adjuvant chemo 6/14/22\par Last seen 6/3/22 recommended to have surveillance colonoscopy in one year, proceed w/ chemotherapy\par \par Patient returns today in follow-up.  Denies abdominal pain.  Occasional gas pain after certain dietary intake.  Moves her bowels regularly.  No bleeding.  Weight stable.  Good appetite.  Reports that she had recent imaging including CT scan that was reportedly normal.  Denies colonoscopy since surgery.

## 2023-06-23 NOTE — PHYSICAL EXAM
[Abdomen Masses] : No abdominal masses [Abdomen Tenderness] : ~T No ~M abdominal tenderness [No HSM] : no hepatosplenomegaly [JVD] : no jugular venous distention  [Normal Breath Sounds] : Normal breath sounds [Normal Heart Sounds] : normal heart sounds [de-identified] : Abdomen is soft, nontender, nondistended. Incisions are well healed. No hernia or masses\par

## 2024-05-21 NOTE — PATIENT PROFILE ADULT - [id number]_GEN_A_NUR

## (undated) DEVICE — DRAPE TOP SHEET 53" X 101"

## (undated) DEVICE — SPECIMEN CONTAINER 4OZ

## (undated) DEVICE — SUT PDO 2-0 1/2 CIRCLE 26MM NDL 20CM

## (undated) DEVICE — TIP METZENBAUM SCISSOR MONOPOLAR ENDOCUT (ORANGE)

## (undated) DEVICE — PACK GENERAL CLOSING

## (undated) DEVICE — MARKING PEN W RULER

## (undated) DEVICE — POSITIONER STRAP KNEE & BODY 3X60" DISP

## (undated) DEVICE — XI ARM FORCEP FENESTRATED BIPOLAR 8MM

## (undated) DEVICE — XI ARM SCISSOR MONO CURVED

## (undated) DEVICE — XI OBTURATOR OPTICAL BLADELESS 8MM

## (undated) DEVICE — SUT SILK 2-0 18" SH (POP-OFF)

## (undated) DEVICE — DRSG STERISTRIPS 0.5 X 4"

## (undated) DEVICE — XI DRAPE COLUMN

## (undated) DEVICE — DRSG DERMABOND 0.7ML

## (undated) DEVICE — XI VESSEL SEALER

## (undated) DEVICE — GOWN XL

## (undated) DEVICE — SYR ASEPTO

## (undated) DEVICE — NDL SPINAL 22G X 3.5" (BLACK)

## (undated) DEVICE — VENODYNE/SCD SLEEVE CALF MEDIUM

## (undated) DEVICE — XI STAPLER SUREFORM 60

## (undated) DEVICE — SUT VICRYL 3-0 18" TIES

## (undated) DEVICE — DRAPE LIGHT HANDLE COVER (BLUE)

## (undated) DEVICE — XI TIP COVER

## (undated) DEVICE — STOPCOCK 4-WAY

## (undated) DEVICE — SUT VICRYL 0 54" TIES

## (undated) DEVICE — GLV 8 PROTEXIS (WHITE)

## (undated) DEVICE — D HELP - CLEARVIEW CLEARIFY SYSTEM

## (undated) DEVICE — PACK PERI GYN

## (undated) DEVICE — TUBING STRYKER PNEUMOSURE HI FLOW INSUFFLATOR

## (undated) DEVICE — INSUFFLATION NDL COVIDIEN SURGINEEDLE VERESS 120MM

## (undated) DEVICE — SYR LUER LOK 30CC

## (undated) DEVICE — Device

## (undated) DEVICE — DRAPE TOWEL BLUE 17" X 24"

## (undated) DEVICE — PACK GENERAL LAPAROSCOPY

## (undated) DEVICE — SUT PDS II 1 27" CT-1

## (undated) DEVICE — GLV 7 PROTEXIS (WHITE)

## (undated) DEVICE — POSITIONER PINK PAD PIGAZZI SYSTEM XL W ARM PROTECTOR

## (undated) DEVICE — SUT VICRYL PLUS 0 36" CT-1

## (undated) DEVICE — WARMING BLANKET UPPER ADULT

## (undated) DEVICE — SUT MONOCRYL 4-0 18" PS-2

## (undated) DEVICE — BLADE SURGICAL #15 CARBON

## (undated) DEVICE — XI ARM NEEDLE DRIVER LARGE

## (undated) DEVICE — TROCAR COVIDIEN VERSAPORT BLADELESS OPTICAL 5MM STANDARD

## (undated) DEVICE — LIGASURE BLUNT TIP 37CM

## (undated) DEVICE — SUT VICRYL 3-0 27" SH

## (undated) DEVICE — FOLEY TRAY 16FR 5CC LF UMETER CLOSED

## (undated) DEVICE — GLV 6.5 PROTEXIS (WHITE)

## (undated) DEVICE — GLV 7.5 PROTEXIS (WHITE)

## (undated) DEVICE — XI DRAPE ARM

## (undated) DEVICE — XI ARM GRASPER TIP UP FENESTRATED

## (undated) DEVICE — XI 12MM AND STAPLER CANNULA SEAL

## (undated) DEVICE — XI ENDOWRIST 12 - 8 MM CANNULA REDUCER

## (undated) DEVICE — XI SEAL UNIV 5- 8 MM